# Patient Record
Sex: MALE | Race: BLACK OR AFRICAN AMERICAN
[De-identification: names, ages, dates, MRNs, and addresses within clinical notes are randomized per-mention and may not be internally consistent; named-entity substitution may affect disease eponyms.]

---

## 2022-08-24 PROBLEM — Z00.00 ENCOUNTER FOR PREVENTIVE HEALTH EXAMINATION: Status: ACTIVE | Noted: 2022-08-24

## 2022-08-29 ENCOUNTER — APPOINTMENT (OUTPATIENT)
Dept: PODIATRY | Facility: CLINIC | Age: 67
End: 2022-08-29

## 2022-08-29 VITALS — WEIGHT: 315 LBS | BODY MASS INDEX: 42.66 KG/M2 | HEIGHT: 72 IN

## 2022-08-29 DIAGNOSIS — B35.3 TINEA PEDIS: ICD-10-CM

## 2022-08-29 DIAGNOSIS — G47.30 SLEEP APNEA, UNSPECIFIED: ICD-10-CM

## 2022-08-29 DIAGNOSIS — B35.1 TINEA UNGUIUM: ICD-10-CM

## 2022-08-29 DIAGNOSIS — Z83.3 FAMILY HISTORY OF DIABETES MELLITUS: ICD-10-CM

## 2022-08-29 DIAGNOSIS — N40.0 BENIGN PROSTATIC HYPERPLASIA WITHOUT LOWER URINARY TRACT SYMPMS: ICD-10-CM

## 2022-08-29 DIAGNOSIS — I10 ESSENTIAL (PRIMARY) HYPERTENSION: ICD-10-CM

## 2022-08-29 DIAGNOSIS — M79.673 PAIN IN UNSPECIFIED FOOT: ICD-10-CM

## 2022-08-29 DIAGNOSIS — Z82.49 FAMILY HISTORY OF ISCHEMIC HEART DISEASE AND OTHER DISEASES OF THE CIRCULATORY SYSTEM: ICD-10-CM

## 2022-08-29 PROCEDURE — 11721 DEBRIDE NAIL 6 OR MORE: CPT

## 2022-08-29 PROCEDURE — 99203 OFFICE O/P NEW LOW 30 MIN: CPT | Mod: 25

## 2022-08-29 RX ORDER — NEBIVOLOL HYDROCHLORIDE 10 MG/1
10 TABLET ORAL
Refills: 0 | Status: ACTIVE | COMMUNITY

## 2022-08-29 RX ORDER — LOSARTAN POTASSIUM 25 MG/1
25 TABLET, FILM COATED ORAL
Refills: 0 | Status: ACTIVE | COMMUNITY

## 2022-08-30 RX ORDER — KETOCONAZOLE 20 MG/G
2 CREAM TOPICAL TWICE DAILY
Qty: 1 | Refills: 3 | Status: ACTIVE | COMMUNITY
Start: 2022-08-29 | End: 1900-01-01

## 2022-09-01 PROBLEM — B35.1 ONYCHOMYCOSIS: Status: ACTIVE | Noted: 2022-08-31

## 2022-09-01 PROBLEM — M79.673 PAIN IN FOOT: Status: ACTIVE | Noted: 2022-08-31

## 2022-09-01 NOTE — ASSESSMENT
[FreeTextEntry1] : \par Treatment: I aggressively debrided and debulked mycotic nails x6 using a small straight nail splitter and rotary jose. They were aggressively debrided and debulked. I debrided and cleansed the interspaces. I ePrescribed Ketoconazole to be used on the skin for one month and on the nails for 3 months. I will see him back in the office for reevaluation as needed.

## 2022-09-01 NOTE — PHYSICAL EXAM
[0] : left foot posterior tibialis 0 [1+] : left foot dorsalis pedis 1+ [Sensation] : the sensory exam was normal to light touch and pinprick [No Focal Deficits] : no focal deficits [Deep Tendon Reflexes (DTR)] : deep tendon reflexes were 2+ and symmetric [Motor Exam] : the motor exam was normal [FreeTextEntry3] : Absent hair growth. [de-identified] : Patient has mild hammertoe and flatfoot deformity. [FreeTextEntry1] : Patient with a T-rbrum infection with xerosis, scaly skin and rash. There is interdigital tinea with some malodor and maceration at the 3rd and 4th interspaces. He has onychomycosis in most nails but the ones that are worse are 1, 2, and 3 on the left and 1, 3, 4 on the right. They are yellow, brittle, chalky with subungual debris and onychomycosis.

## 2022-09-01 NOTE — HISTORY OF PRESENT ILLNESS
[Sneakers] : lawanda [FreeTextEntry1] : Patient presents today for evaluation of multiple issues. He has a bad case of athlete's foot and rash that is spreading.He wears boots often and has a bad case of onychomycosis that he cannot care for.\par